# Patient Record
(demographics unavailable — no encounter records)

---

## 2020-01-01 NOTE — PCM.NBADM
[facility] History





-  Admission Detail


Date of Service: 20


[facility] Admission Detail: 


 at 40w0d


Infant Delivery Method: Spontaneous Vaginal Delivery-Single





- Maternal History


Estimated Date of Confinement: 20


: 2


Term: 1


: 0


Abortions: 0


Live Births: 1


Mother's Blood Type: O


Mother's Rh: Positive


Maternal Hepatitis B: Negative


Maternal STD: Negative


Maternal HIV: Negative


Maternal Group Beta Strep/GBS: Postitive


Maternal VDRL: Negative


Prenatal Care Received: Yes


Prenatal Events: Labor Induction, Labor Augmentation


Pregnancy Complications: Group B Strep Positive, Treated for GBS





- Delivery Data


Delivery Data: 





 at 40w0d after elective IOL at term


APGAR Total Score 1 Minute: 8


APGAR Total Score 5 Minutes: 9


Resuscitation Effort: Bulb Suction, Dried and Stimulated


[place] Support Required: After Delivery of Infant


Anomalies Noted: None


Infant Delivery Method: Spontaneous Vaginal Delivery





[facility] Nursery Information


Gestation Age (Weeks,Days): Weeks (40), Days (0)


Sex, Infant: Female


Weight: 3.485 kg


Length: 50.17 cm


Cry Description: Strong, Lusty


[place] Reflex: Normal Response


Suck Reflex: Normal Response


Bed Type: Radiant Warmer


Birth Complications: None





 Physician Exam





- Exam


Exam: See Below


Activity: Sleeping


Resting Posture: Flexion


Head: Face Symmetrical, Atraumatic, Normocephalic


Eyes: Bilateral: Normal Inspection


Ears: Normal Appearance


Nose: Normal Inspection


Mouth: Nnormal Inspection, Palate Intact


Chest/Cardiovascular: Regular Heart Rate, Symmetrical.  No: Murmur


Respiratory: Lungs Clear, Normal Breath Sounds, No Respiratoy Distress


Abdomen/GI: Soft


Genitalia (Female): Normal External Exam


Spine/Skeletal: Normal Inspection


Extremities: Normal Inspection


Skin: Dry, Intact, Normal Color, Warm





 Assessment and Plan


(1) 


SNOMED Code(s): 334031696


   Code(s): Z38.2 - SINGLE LIVEBORN INFANT, UNSPECIFIED AS TO PLACE OF BIRTH   

Status: Acute   Current Visit: Yes   


Problem List Initiated/Reviewed/Updated: Yes


Orders (Last 24 Hours): 


                               Active Orders 24 hr











 Category Date Time Status


 


 Patient Status [ADT] Routine ADT  20 15:53 Ordered


 


  Hearing Screen [RC] ASDIRECTED Care  20 15:53 Ordered


 


  Intake and Output [RC] ASDIRECTED Care  20 15:53 Ordered


 


 Notify Provider [RC] PRN Care  20 15:53 Ordered


 


 Vital Measures, Spiro [RC] Per Unit Routine Care  20 15:53 Ordered


 


 Infant Pediatric Formula [DIET] Diet  20 Dinner Ordered


 


 HEMOGLOBIN/HEMATOCRIT,HH [HEME] Routine Lab  20 15:53 Ordered


 


  SCREENING (STATE) [POC] Routine Lab  20 15:53 Ordered


 


 Transcutaneous Bilirubinometer [OM.PC] Routine Oth  20 15:53 Ordered


 


 Resuscitation Status Routine Resus Stat  20 15:52 Ordered











Plan: 





Spiro female infant born via  at 40w0d





1. Initiate routine  cares


2. Will bottlefeed as mother was transferred to [facility] 


3. Anticipate discharge 2020





Esther Mercedes MD

## 2020-01-01 NOTE — PCM.NBDC
Discharge Summary





- Hospital Course


Free Text/Narrative: 





1 day old female infant born via  at 40w0d





- Discharge Data


Date of Birth: 20


Delivery Time: 12:27


Date of Discharge: 20


Discharge Disposition: Home, Self-Care 01


Condition: Good





- Discharge Diagnosis/Problem(s)


(1) 


SNOMED Code(s): 325194685


   ICD Code: Z38.2 - SINGLE LIVEBORN INFANT, UNSPECIFIED AS TO PLACE OF BIRTH   

Status: Acute   Current Visit: Yes   


Qualifiers: 


   Gestational age of : 40 completed weeks   Qualified Code(s): Z38.2 - 

Single liveborn infant, unspecified as to place of birth   





- Patient Summary Data


Consults:: 





None


Labs/Studies Pending at DC:: 





 metabolic screen


Recommended Follow-up Testing/Procedures:: 





None


Planned Procedure(s):: 





None


Hospital Course:: 





Unremarkable.  Patient is bottlefeeding well.  She is voiding and stooling 

regularly.  No concerns per aunt who is caring for her while her mother is in 

the hospital in [place].  No concerns per nursing staff.





- Discharge Plan


Instructions:  Well , , SIDS Prevention Information, 

Easy-to-Read, Jaundice, [place], Easy-to-Read


Referrals: 


Esther Mercedes MD [Physician] -  (Friday)





- Discharge Summary/Plan Comment


DC Time >30 min.: No


Discharge Summary/Plan:: 





Discharge home today.  Aunt will be caring for patient until her mother is 

discharged from the hospital which will be later tonight or tomorrow morning.  

Follow-up on Friday for weight check.  Routine discharge information provided by

 nursing staff.





 Discharge Instructions





- Discharge [place]


Diet: Formula


Activity: Don't Co-Sleep w/Infant, Keep Away-Large Crowds, Keep Away-Sick 

People, Place on Back to Sleep


Notify Provider of: Fever Over 100.4 Rectally, Refuse 2 or More Feedings, 

Persistent Irritability, New Jaundice Skin/Eyes, No Wet Diaper Over 18 Hrs


Go to Emergency Department or Call 911 If: Difficulty Breathing, Infant is 

Lifeless, Infant is Limp, Skin Turns Blue in Color, Skin Turns Pale


Cord Care: Don't Submerge in Tub, Sponge Bathe Only


Immunizations Given During Stay: Hepatitis B


OAE Results Left Ear: Pass


OAE Results Right Ear: Pass





 History





-  Admission Detail


Date of Service: 20


Infant Delivery Method: Spontaneous Vaginal Delivery-Single





- Maternal History


Estimated Date of Confinement: 20


: 2


Term: 1


: 0


Abortions: 0


Live Births: 1


Mother's Blood Type: O


Mother's Rh: Positive


Maternal Hepatitis B: Negative


Maternal STD: Negative


Maternal HIV: Negative


Maternal Group Beta Strep/GBS: Postitive


Maternal VDRL: Negative


Prenatal Care Received: Yes


Prenatal Events: Labor Induction, Labor Augmentation


Pregnancy Complications: Group B Strep Positive, Treated for GBS





- Delivery Data


APGAR Total Score 1 Minute: 8


APGAR Total Score 5 Minutes: 9


Resuscitation Effort: Bulb Suction, Dried and Stimulated


[place] Support Required: After Delivery of Infant


Anomalies Noted: None


Infant Delivery Method: Spontaneous Vaginal Delivery





[place] Nursery Info & Exam





- Exam


Exam: See Below





- Vital Signs


Vital Signs: 


                                Last Vital Signs











Temp  36.7 C   20 11:44


 


Pulse  128   20 11:44


 


Resp  38   20 11:44


 


BP  84/64   20 08:00


 


Pulse Ox      











 Birth Weight: 3.485 kg


Current Weight: 3.46 kg


Height: 50.17 cm





- Nursery Information


Sex, Infant: Female


Cry Description: Strong, Lusty


[place] Reflex: Normal Response


Suck Reflex: Normal Response


Head Circumference: 34.93 cm


Abdominal Girth: 33.66 cm


Bed Type: Open Crib


Anomalies Noted: None


Birth Complications: None





- General/Neuro


Activity: Active


Resting Posture: Flexion





- [place] Scoring


Neuro Posture, NB: Flexion All Limbs


Neuro Square Window: Wrist 0 Degrees


Neuro Arm Recoil: Arm Recoil  Degrees


Neuro Popliteal Angle: Popliteal Angle 90 Degrees


Neuro Scarf Sign: Elbow at Same Side


Neuro Heel to Ear: Knee Bent to 90 Heel Reaches 90 Degrees from Prone


Neuro Maturity Score: 20


Physical Skin: Bala, Deep Cracking, No Vessels


Physical Lanugo: Bald Areas


Physical Plantar Surface: Creases Over Entire Sole


Physical Breast: Raised Areola, 3-4 mm Bud


Physical Eye/Ear: Formed and Firm, Instant Recoil


Physical Genitals - Female: Majora Large, Minora Small


Physical Maturity Score: 20


Maturity Ratin


Gestational Age in Weeks: 40 Weeks (Maturity Score 40)





- Physical Exam


Head: Face Symmetrical, Atraumatic, Normocephalic


Eyes: Bilateral: Normal Inspection


Ears: Normal Appearance, Symmetrical


Nose: Normal Inspection, Normal Mucosa


Mouth: Nnormal Inspection, Palate Intact


Neck: Normal Inspection, Supple


Chest/Cardiovascular: Normal Peripheral Pulses, Regular Heart Rate, Symmetrical


Respiratory: Lungs Clear, Normal Breath Sounds, No Respiratoy Distress


Abdomen/GI: No Mass, Pelvis Stable, Soft


Rectal: Normal Exam


Genitalia (Female): Normal External Exam


Spine/Skeletal: Normal Inspection


Extremities: Normal Inspection, Normal Range of Motion


Skin: Dry, Intact, Normal Color, Warm





 POC Testing





- Congenital Heart Disease Screening


CCHD O2 Saturation, Right Hand: 99


CCHD O2 Saturation, Right Foot: 99


CCHD Screen Result: Pass





- Bilirubin Screening


Delivery Date: 20


Delivery Time: 12:27